# Patient Record
Sex: MALE | Race: WHITE | Employment: UNEMPLOYED | URBAN - METROPOLITAN AREA
[De-identification: names, ages, dates, MRNs, and addresses within clinical notes are randomized per-mention and may not be internally consistent; named-entity substitution may affect disease eponyms.]

---

## 2017-01-04 PROBLEM — M54.12 CERVICAL RADICULOPATHY: Status: ACTIVE | Noted: 2017-01-04

## 2017-01-18 PROBLEM — Z98.1 HX OF FUSION OF CERVICAL SPINE: Status: ACTIVE | Noted: 2017-01-18

## 2017-01-18 PROBLEM — M47.812 SPONDYLOSIS OF CERVICAL REGION WITHOUT MYELOPATHY OR RADICULOPATHY: Status: ACTIVE | Noted: 2017-01-18

## 2017-02-17 ENCOUNTER — MED REC SCAN ONLY (OUTPATIENT)
Dept: INTERNAL MEDICINE CLINIC | Facility: CLINIC | Age: 47
End: 2017-02-17

## 2017-08-11 ENCOUNTER — TELEPHONE (OUTPATIENT)
Dept: INTERNAL MEDICINE CLINIC | Facility: CLINIC | Age: 47
End: 2017-08-11

## 2017-08-11 NOTE — TELEPHONE ENCOUNTER
Pt called stating he was out of town and he has pain in his upper back-no injury-he said he was going to the hospital in [place] and would call us back on Monday to sched hosp fup w/triage when he gets back for sometime next week

## 2017-09-18 ENCOUNTER — TELEPHONE (OUTPATIENT)
Dept: INTERNAL MEDICINE CLINIC | Facility: CLINIC | Age: 47
End: 2017-09-18

## 2017-09-18 NOTE — TELEPHONE ENCOUNTER
Please call patient. We received a paper prescription order requesting Lidopril. I don't see that I have ever prescribed that for patient. Did he have another doctor prescribe this in the past. Why does he need this med?  I think he may be seeing Ortho or N

## 2017-09-25 NOTE — TELEPHONE ENCOUNTER
#3 LM for pt at [phone] (H) vm to call back re: Lidopril medication, need information re: the med.

## 2017-12-01 ENCOUNTER — OFFICE VISIT (OUTPATIENT)
Dept: FAMILY MEDICINE CLINIC | Facility: CLINIC | Age: 47
End: 2017-12-01

## 2017-12-01 VITALS
TEMPERATURE: 98 F | HEIGHT: 72 IN | HEART RATE: 76 BPM | DIASTOLIC BLOOD PRESSURE: 68 MMHG | BODY MASS INDEX: 28.71 KG/M2 | SYSTOLIC BLOOD PRESSURE: 106 MMHG | WEIGHT: 212 LBS

## 2017-12-01 DIAGNOSIS — Z00.00 ROUTINE GENERAL MEDICAL EXAMINATION AT A HEALTH CARE FACILITY: Primary | ICD-10-CM

## 2017-12-01 DIAGNOSIS — J01.10 ACUTE FRONTAL SINUSITIS, RECURRENCE NOT SPECIFIED: ICD-10-CM

## 2017-12-01 PROCEDURE — 99213 OFFICE O/P EST LOW 20 MIN: CPT | Performed by: FAMILY MEDICINE

## 2017-12-01 PROCEDURE — 99396 PREV VISIT EST AGE 40-64: CPT | Performed by: FAMILY MEDICINE

## 2017-12-01 RX ORDER — AZITHROMYCIN 250 MG/1
TABLET, FILM COATED ORAL
Qty: 6 TABLET | Refills: 0 | Status: SHIPPED | OUTPATIENT
Start: 2017-12-01 | End: 2018-10-23 | Stop reason: ALTCHOICE

## 2017-12-01 RX ORDER — LEVALBUTEROL TARTRATE 45 UG/1
1-2 AEROSOL, METERED ORAL
COMMUNITY
Start: 2017-09-28

## 2017-12-01 RX ORDER — PANTOPRAZOLE SODIUM 20 MG/1
1 TABLET, DELAYED RELEASE ORAL EVERY MORNING
COMMUNITY
Start: 2017-10-24 | End: 2019-12-03

## 2017-12-01 RX ORDER — BECLOMETHASONE DIPROPIONATE 80 UG/1
2 AEROSOL, METERED NASAL DAILY
COMMUNITY
Start: 2017-09-28

## 2017-12-01 NOTE — PROGRESS NOTES
Sheron Shi is a 52year old male who is here for Patient presents with:  Sinus Problem: Rm. 1      HPI:     URI  -started 2 wks ago  -associated with sinus congestion and pressure and nasal congestion  -previous treatment: dayquil hasn't helped as Encounters:  12/01/17 : 212 lb  01/18/17 : 210 lb  01/04/17 : 215 lb  10/05/16 : 209 lb  03/25/15 : 225 lb  01/23/15 : 219 lb        Pcn [Penicillins]       Unknown    Comment:Possible sensitivity, Family history    Patient Active Problem List:     Monet Oar TRANSFORAMINAL *      Comment: Procedure: TRANSFORAMINAL EPIDURAL - LUMBAR;                 Surgeon: Maria Elena Manrique MD;  Location: [address] MANAGEMENT  12/10/2014: INJECTION, W/WO CONTRAST, DX/THERAPEUTIC SUBST* N/A      Comment: previous total cholesterol lab    ASSESSMENT AND PLAN:     Health Maintenance  -We discussed the following:  Healthy diet and exercise, immunizations, and cancer screening    -Fasting labs ordered    Stress Management: counseled - f/u if worsening  Not int

## 2017-12-01 NOTE — PATIENT INSTRUCTIONS
--rest, fluids, soups, humidifier, tea with lemon or honey, tylenol/advil as needed for discomfort  -- cough syrup before bed or as needed (no driving as may cause drowsiness)  -- otc generic mucinex (or with DM if coughing)  can help loosen up congestio

## 2017-12-06 ENCOUNTER — LAB ENCOUNTER (OUTPATIENT)
Dept: LAB | Age: 47
End: 2017-12-06
Attending: FAMILY MEDICINE
Payer: COMMERCIAL

## 2017-12-06 DIAGNOSIS — Z00.00 ROUTINE GENERAL MEDICAL EXAMINATION AT A HEALTH CARE FACILITY: ICD-10-CM

## 2017-12-06 PROCEDURE — 36415 COLL VENOUS BLD VENIPUNCTURE: CPT | Performed by: FAMILY MEDICINE

## 2017-12-06 PROCEDURE — 80050 GENERAL HEALTH PANEL: CPT | Performed by: FAMILY MEDICINE

## 2017-12-06 PROCEDURE — 82306 VITAMIN D 25 HYDROXY: CPT | Performed by: FAMILY MEDICINE

## 2017-12-06 PROCEDURE — 80061 LIPID PANEL: CPT | Performed by: FAMILY MEDICINE

## 2018-08-13 ENCOUNTER — HOSPITAL ENCOUNTER (OUTPATIENT)
Age: 48
Discharge: HOME | End: 2018-08-13
Payer: COMMERCIAL

## 2018-08-13 VITALS
DIASTOLIC BLOOD PRESSURE: 85 MMHG | RESPIRATION RATE: 19 BRPM | SYSTOLIC BLOOD PRESSURE: 129 MMHG | HEART RATE: 83 BPM | OXYGEN SATURATION: 97 % | TEMPERATURE: 99 F

## 2018-08-13 DIAGNOSIS — S91.332A PUNCTURE WOUND OF PLANTAR ASPECT OF LEFT FOOT, INITIAL ENCOUNTER: Primary | ICD-10-CM

## 2018-08-13 PROCEDURE — 99213 OFFICE O/P EST LOW 20 MIN: CPT

## 2018-08-13 PROCEDURE — 90471 IMMUNIZATION ADMIN: CPT

## 2018-08-13 NOTE — ED PROVIDER NOTES
Patient Seen in: [facility]    History   Patient presents with:  Laceration Abrasion (integumentary)    Stated Complaint: left foot puncture wound stepped on nail    HPI  Elena Clarion is a 75-year-old male who comes in today after ac [facility]  4/5/2013: INJECTION, ANESTHETIC/STEROID, TRANSFORAMINAL *      Comment: Procedure: TRANSFORAMINAL EPIDURAL - LUMBAR;                 Surgeon: Davin Aldana MD;  Location: [facility] [address] Constitutional: He is oriented to person, place, and time. He appears well-developed and well-nourished. No distress. HENT:   Head: Normocephalic and atraumatic. Neck: Normal range of motion.    Cardiovascular: Normal rate, regular rhythm, normal heart Medications Prescribed:  Current Discharge Medication List         I have given the patient instructions regarding his diagnosis, expectations, follow up, and return to the ER precautions.   I explained to the patient that emergent conditions may arise to r

## 2018-10-23 ENCOUNTER — OFFICE VISIT (OUTPATIENT)
Dept: FAMILY MEDICINE CLINIC | Facility: CLINIC | Age: 48
End: 2018-10-23
Payer: COMMERCIAL

## 2018-10-23 VITALS
DIASTOLIC BLOOD PRESSURE: 78 MMHG | SYSTOLIC BLOOD PRESSURE: 126 MMHG | BODY MASS INDEX: 28.17 KG/M2 | OXYGEN SATURATION: 97 % | TEMPERATURE: 98 F | HEIGHT: 72 IN | HEART RATE: 90 BPM | WEIGHT: 208 LBS

## 2018-10-23 DIAGNOSIS — M51.36 DDD (DEGENERATIVE DISC DISEASE), LUMBAR: ICD-10-CM

## 2018-10-23 DIAGNOSIS — Z98.890 STATUS POST LUMBAR SURGERY: ICD-10-CM

## 2018-10-23 DIAGNOSIS — M54.16 LUMBAR RADICULOPATHY: Primary | ICD-10-CM

## 2018-10-23 PROCEDURE — 99215 OFFICE O/P EST HI 40 MIN: CPT | Performed by: FAMILY MEDICINE

## 2018-10-23 RX ORDER — CYCLOBENZAPRINE HCL 10 MG
10 TABLET ORAL 3 TIMES DAILY
Qty: 30 TABLET | Refills: 1 | Status: SHIPPED | OUTPATIENT
Start: 2018-10-23 | End: 2018-11-12

## 2018-10-23 RX ORDER — NABUMETONE 500 MG/1
500 TABLET, FILM COATED ORAL 2 TIMES DAILY PRN
Qty: 60 TABLET | Refills: 1 | Status: SHIPPED | OUTPATIENT
Start: 2018-10-23 | End: 2019-12-03

## 2018-10-23 NOTE — PATIENT INSTRUCTIONS
-- start nabumetone 2x/day as needed with food for pain  -- if upset stomach, can go back to ibuprofen/advil as needed   -- cyclobenzaprine as needed for more severe pain (may cause drowsiness)  -- call to schedule PT  -- followup 4 wks  For recheck  --

## 2018-10-26 NOTE — PROGRESS NOTES
Maggie Lord is a 50year old male here for Patient presents with:  Back Pain: x 8 years on and off getting worse for the last 3 weeks /room 3 MM      HPI:     Back pain  -started many years ago (about 8)  -4 yrs ago, had back surgery  -helped make pa ANESTHETIC/STEROID, TRANSFORAMINAL EPIDURAL; LUMBAR/SACRAL, SINGLE LEVEL  4/5/2013    Procedure: TRANSFORAMINAL EPIDURAL - LUMBAR;  Surgeon: Leesa Alvarez MD;  Location: [address] [place] [address]   • INJECTION, W/WO CONTRAST, DX/THERAPEUTIC SUBSTANC prior to today's visit):    Current Outpatient Medications:  Cyclobenzaprine HCl 10 MG Oral Tab Take 1 tablet (10 mg total) by mouth 3 (three) times daily for 20 days.  Disp: 30 tablet Rfl: 1   Nabumetone 500 MG Oral Tab Take 1 tablet (500 mg total) by mout deficits reassuring  -encouraged exercise, stretching (handouts given), warm/ice packs prn  -nabumetone prn (take with food) and short course of muscle relaxer (no driving while on as may cause drowsiness)  -start PT as planned  -if pain improving, will re

## 2018-10-29 ENCOUNTER — OFFICE VISIT (OUTPATIENT)
Dept: PHYSICAL THERAPY | Age: 48
End: 2018-10-29
Attending: FAMILY MEDICINE
Payer: COMMERCIAL

## 2018-10-29 DIAGNOSIS — M54.16 LUMBAR RADICULOPATHY: ICD-10-CM

## 2018-10-29 DIAGNOSIS — M51.36 DDD (DEGENERATIVE DISC DISEASE), LUMBAR: ICD-10-CM

## 2018-10-29 DIAGNOSIS — Z98.890 STATUS POST LUMBAR SURGERY: ICD-10-CM

## 2018-10-29 PROCEDURE — 97161 PT EVAL LOW COMPLEX 20 MIN: CPT | Performed by: PHYSICAL THERAPIST

## 2018-10-29 PROCEDURE — 97110 THERAPEUTIC EXERCISES: CPT | Performed by: PHYSICAL THERAPIST

## 2018-10-29 NOTE — PROGRESS NOTES
SPINE EVALUATION:   Referring Physician: Dr. Marce Garcia  Diagnosis: Lumbar radiculopathy DDD lumbar spine     Date of Service: 10/29/2018     PATIENT SUMMARY   Anuj Miller is a 50year old y/o male who presents to therapy today with complaints of chron you for referring Sadie Davila to [facility].     Precautions:  None  OBJECTIVE:   Observation/Posture: forward head, rounded shoulders, seated with post pelvic tilt  Gait: no deviations noted  Neuro Screen: dermatome, myotome and reflexes normal and improve spinal safety (8 visits)  · Pt will report improved symptom centralization and absence of radicular symptoms for 3 consecutive days to improve function with ADL (8 visits)  · Pt will have decreased paraspinal mm tension for improved lumbar mobi

## 2018-11-01 ENCOUNTER — OFFICE VISIT (OUTPATIENT)
Dept: PHYSICAL THERAPY | Age: 48
End: 2018-11-01
Attending: FAMILY MEDICINE
Payer: COMMERCIAL

## 2018-11-01 PROCEDURE — 97110 THERAPEUTIC EXERCISES: CPT | Performed by: PHYSICAL THERAPIST

## 2018-11-01 NOTE — PROGRESS NOTES
Dx: lumbar radiculopathy         Authorized # of Visits:  n/a         Next MD visit: none scheduled  Fall Risk: standard         Precautions: n/a             Subjective: Pt states he has been feeling better with exercises and medications since evaluation. Gr II, STM to lumbar paraspinals/ R piriformis           MINS                                          Charges: there ex X 3       Total Timed Treatment: 45 min  Total Treatment Time: 45 min

## 2018-11-06 ENCOUNTER — OFFICE VISIT (OUTPATIENT)
Dept: PHYSICAL THERAPY | Age: 48
End: 2018-11-06
Attending: FAMILY MEDICINE
Payer: COMMERCIAL

## 2018-11-06 PROCEDURE — 97140 MANUAL THERAPY 1/> REGIONS: CPT | Performed by: PHYSICAL THERAPIST

## 2018-11-06 PROCEDURE — 97110 THERAPEUTIC EXERCISES: CPT | Performed by: PHYSICAL THERAPIST

## 2018-11-06 NOTE — PROGRESS NOTES
Dx: lumbar radiculopathy         Authorized # of Visits:  n/a         Next MD visit: none scheduled  Fall Risk: standard         Precautions: n/a             Subjective: Pt reports improvement in intensity of pain by 2 points. No pain in leg when walking. Supine pelvic tilt X 20 Pelvic tilt X 20        Lower trunk rotation X 20 Lower trunk rotation X 20        Piriformis stretch 30 sec X 3 Side lying trunk rotation X 10 ea        TA bracing X 10 Hip adductor isometric X 20        Knee fall out with TA bra

## 2018-11-08 ENCOUNTER — OFFICE VISIT (OUTPATIENT)
Dept: PHYSICAL THERAPY | Age: 48
End: 2018-11-08
Attending: FAMILY MEDICINE
Payer: COMMERCIAL

## 2018-11-08 ENCOUNTER — HOSPITAL ENCOUNTER (OUTPATIENT)
Dept: MRI IMAGING | Age: 48
Discharge: HOME | End: 2018-11-08
Attending: FAMILY MEDICINE
Payer: COMMERCIAL

## 2018-11-08 DIAGNOSIS — M51.36 DDD (DEGENERATIVE DISC DISEASE), LUMBAR: ICD-10-CM

## 2018-11-08 DIAGNOSIS — M54.16 LUMBAR RADICULOPATHY: ICD-10-CM

## 2018-11-08 DIAGNOSIS — Z98.890 STATUS POST LUMBAR SURGERY: ICD-10-CM

## 2018-11-08 PROCEDURE — 72148 MRI LUMBAR SPINE W/O DYE: CPT | Performed by: FAMILY MEDICINE

## 2018-11-08 PROCEDURE — 97110 THERAPEUTIC EXERCISES: CPT | Performed by: PHYSICAL THERAPIST

## 2018-11-08 NOTE — PROGRESS NOTES
Dx: lumbar radiculopathy         Authorized # of Visits:  n/a         Next MD visit: none scheduled  Fall Risk: standard         Precautions: n/a             Subjective: Pt states his hip felt a lot better after 1 hr post PT until this morning.  Pt continue in back no change in leg Dynamic HS stretch in neutral, IR, ER 5 sec X 5       Supine pelvic tilt X 20 Pelvic tilt X 20 Hip swings F-B, S-S X 20       Lower trunk rotation X 20 Lower trunk rotation X 20 1/2 kneel hip flexor stretch 30 sec X 3       Pirifor

## 2018-11-13 ENCOUNTER — OFFICE VISIT (OUTPATIENT)
Dept: PHYSICAL THERAPY | Age: 48
End: 2018-11-13
Attending: FAMILY MEDICINE
Payer: COMMERCIAL

## 2018-11-13 PROCEDURE — 97110 THERAPEUTIC EXERCISES: CPT | Performed by: PHYSICAL THERAPIST

## 2018-11-13 NOTE — PROGRESS NOTES
Dx: lumbar radiculopathy         Authorized # of Visits:  n/a         Next MD visit: none scheduled  Fall Risk: standard         Precautions: n/a             Subjective: Pt reports feeling better for 2 days post PT.  Increased pain noted on Saturday from pr extension with and without exhalation X 20 Prone press up X 5 increase pain in back no change in leg Dynamic HS stretch in neutral, IR, ER 5 sec X 5 Dynamic HS stretch in neutral, IR, ER 5 sec X 5      Supine pelvic tilt X 20 Pelvic tilt X 20 Hip swings F-

## 2018-11-15 ENCOUNTER — APPOINTMENT (OUTPATIENT)
Dept: PHYSICAL THERAPY | Age: 48
End: 2018-11-15
Attending: FAMILY MEDICINE
Payer: COMMERCIAL

## 2018-11-20 ENCOUNTER — OFFICE VISIT (OUTPATIENT)
Dept: PHYSICAL THERAPY | Age: 48
End: 2018-11-20
Attending: FAMILY MEDICINE
Payer: COMMERCIAL

## 2018-11-20 PROCEDURE — 97110 THERAPEUTIC EXERCISES: CPT | Performed by: PHYSICAL THERAPIST

## 2018-11-20 NOTE — PROGRESS NOTES
Dx: lumbar radiculopathy         Authorized # of Visits:  n/a         Next MD visit: none scheduled  Fall Risk: standard         Precautions: n/a             Subjective: Pt reports decrease in frequency of flare ups of low back symptoms by 50%.  Pt reports stretch 30 sec X 3     Standing lumbar extension with and without exhalation X 20 Prone press up X 5 increase pain in back no change in leg Dynamic HS stretch in neutral, IR, ER 5 sec X 5 Dynamic HS stretch in neutral, IR, ER 5 sec X 5 Dynamic HS stretch i

## 2018-11-27 ENCOUNTER — OFFICE VISIT (OUTPATIENT)
Dept: PHYSICAL THERAPY | Age: 48
End: 2018-11-27
Attending: FAMILY MEDICINE
Payer: COMMERCIAL

## 2018-11-27 PROCEDURE — 97140 MANUAL THERAPY 1/> REGIONS: CPT | Performed by: PHYSICAL THERAPIST

## 2018-11-27 PROCEDURE — 97110 THERAPEUTIC EXERCISES: CPT | Performed by: PHYSICAL THERAPIST

## 2018-11-27 NOTE — PROGRESS NOTES
Dx: lumbar radiculopathy         Authorized # of Visits:  n/a         Next MD visit: none scheduled  Fall Risk: standard         Precautions: n/a             Subjective: Pt reports increase in pain due to return to full work out yesterday.  Pain in R LE at sec X 3 Slant board stretch 30 sec X 3 Slant board stretch 30 sec X 3 Slant board stretch 30 sec X 3 Slant board stretch 30 sec X 3    Standing lumbar extension with and without exhalation X 20 Prone press up X 5 increase pain in back no change in leg [place] Charges: there ex X 2, man there X 1    Total Timed Treatment: 45 min  Total Treatment Time: 45 min

## 2018-11-29 ENCOUNTER — APPOINTMENT (OUTPATIENT)
Dept: PHYSICAL THERAPY | Age: 48
End: 2018-11-29
Payer: COMMERCIAL

## 2018-12-04 ENCOUNTER — OFFICE VISIT (OUTPATIENT)
Dept: PHYSICAL THERAPY | Age: 48
End: 2018-12-04
Attending: FAMILY MEDICINE
Payer: COMMERCIAL

## 2018-12-04 PROCEDURE — 97110 THERAPEUTIC EXERCISES: CPT | Performed by: PHYSICAL THERAPIST

## 2018-12-04 NOTE — PROGRESS NOTES
Dx: lumbar radiculopathy         Authorized # of Visits:  n/a         Next MD visit: none scheduled  Fall Risk: standard         Precautions: n/a            Progress Summary    Pt has attended 8 visits in Physical Therapy.      Subjective: Mr. Ruth Gomez repor improved symptom centralization and absence of radicular symptoms for 3 consecutive days to improve function with ADL (8 visits) PROGRESSING  · Pt will have decreased paraspinal mm tension for improved lumbar mobility to tolerate standing >30 minutes for w EX 45  MINS       Recumbent bike 5 min level 4 Recumbent bike 5 min level 4 Upright bike X 5 min Recumbent bike X 5 min level 4       Slant board stretch 30 sec X 3 Slant board stretch 30 sec X 3 Slant board stretch 30 sec X 3 Slant board stretch 30 sec X

## 2018-12-06 ENCOUNTER — APPOINTMENT (OUTPATIENT)
Dept: PHYSICAL THERAPY | Age: 48
End: 2018-12-06
Payer: COMMERCIAL

## 2018-12-11 ENCOUNTER — APPOINTMENT (OUTPATIENT)
Dept: PHYSICAL THERAPY | Age: 48
End: 2018-12-11
Attending: FAMILY MEDICINE
Payer: COMMERCIAL

## 2018-12-13 ENCOUNTER — OFFICE VISIT (OUTPATIENT)
Dept: PHYSICAL THERAPY | Age: 48
End: 2018-12-13
Attending: FAMILY MEDICINE
Payer: COMMERCIAL

## 2018-12-13 PROCEDURE — 97110 THERAPEUTIC EXERCISES: CPT | Performed by: PHYSICAL THERAPIST

## 2018-12-13 NOTE — PROGRESS NOTES
Dx: lumbar radiculopathy         Authorized # of Visits:  n/a         Next MD visit: none scheduled  Fall Risk: standard         Precautions: n/a      Subjective: Pt reports decreasing frequency of leg pain with less intensity.  Pt is now able to do more ph Date: 11/20/2018  TX#: 6/8 Date: 11/27/2018  TX#: 7/8 Date: 12/4/2018  TX#: 8/8 Date: 12/13/2018  TX#: 9/16 Date:  TX#: /16 Date:  TX#: /16 Date:  TX#: /16   THERE EX 45  MINS THERE EX 45  MINS THERE EX 30  MINS THERE EX 45  MINS THERE EX 39  MINS      Rec Reassessment Hip hinge with cane X 20        Central PA glide lumbar spine, side lying lumbar gapping gr IV, V, unilat glides with sciatic nerve stretch  Hip hinge without cane X 10          Floor to waist lift 10# X 20                                Charg

## 2018-12-19 ENCOUNTER — OFFICE VISIT (OUTPATIENT)
Dept: PHYSICAL THERAPY | Age: 48
End: 2018-12-19
Attending: FAMILY MEDICINE
Payer: COMMERCIAL

## 2018-12-19 PROCEDURE — 97110 THERAPEUTIC EXERCISES: CPT | Performed by: PHYSICAL THERAPIST

## 2018-12-19 NOTE — PROGRESS NOTES
Dx: lumbar radiculopathy         Authorized # of Visits:  n/a         Next MD visit: none scheduled  Fall Risk: standard         Precautions: n/a      Subjective: Pt continues to report improving leg and back pain stating he now has intermittent low back p 11/20/2018  TX#: 6/8 Date: 11/27/2018  TX#: 7/8 Date: 12/4/2018  TX#: 8/8 Date: 12/13/2018  TX#: 9/16 Date: 12/19/2018  TX#: 10/16 Date:  TX#: /16 Date:  TX#: /16   THERE EX 45  MINS THERE EX 45  MINS THERE EX 30  MINS THERE EX [place] THERE EX 39  MINS T press 6 bands 2 X 20 Hip abd isometric X 20 Shuttle unilat leg press 4 bands 2 X 10 SB supine walkout X 10 Shuttle unilat leg press 4 bands 2 X 20     Seated piriformis 30 sec X 3 Seated sciatic N glide sliders 2 X 10 Standing lumbar extension X 20 Lateral

## 2018-12-26 ENCOUNTER — APPOINTMENT (OUTPATIENT)
Dept: PHYSICAL THERAPY | Age: 48
End: 2018-12-26
Attending: FAMILY MEDICINE
Payer: COMMERCIAL

## 2019-01-08 ENCOUNTER — OFFICE VISIT (OUTPATIENT)
Dept: PHYSICAL THERAPY | Age: 49
End: 2019-01-08
Attending: FAMILY MEDICINE
Payer: COMMERCIAL

## 2019-01-08 PROCEDURE — 97110 THERAPEUTIC EXERCISES: CPT | Performed by: PHYSICAL THERAPIST

## 2019-01-08 NOTE — PROGRESS NOTES
Dx: lumbar radiculopathy         Authorized # of Visits:  n/a         Next MD visit: none scheduled  Fall Risk: standard         Precautions: n/a      Subjective: Pt reports continued pain relief stating R leg pain is now intermittent ranging between 0-2/1 5/8 Date: 11/20/2018  TX#: 6/8 Date: 11/27/2018  TX#: 7/8 Date: 12/4/2018  TX#: 8/8 Date: 12/13/2018  TX#: 9/16 Date: 12/19/2018  TX#: 10/16 Date: 1/8/2019  TX#:11 /16 Date:  TX#: /16   THERE EX 45  MINS THERE EX 45  MINS THERE EX 30  MINS THERE EX 45  MIN Shuttle unilat leg press 4 bands X 20 Shuttle unilat leg press 4 bands X 20 Supine march with TA brace X 20 Shuttle chayo leg press 6 bands 2 X 20 Seated SB knee ext 2 X 10 Shuttle chayo leg press 8 bands 2 X 20 SB seated pelvic tilt F-B, S-S, circles CW, CC

## 2019-01-15 ENCOUNTER — OFFICE VISIT (OUTPATIENT)
Dept: PHYSICAL THERAPY | Age: 49
End: 2019-01-15
Attending: FAMILY MEDICINE
Payer: COMMERCIAL

## 2019-01-15 PROCEDURE — 97110 THERAPEUTIC EXERCISES: CPT | Performed by: PHYSICAL THERAPIST

## 2019-01-15 NOTE — PROGRESS NOTES
Dx: lumbar radiculopathy         Authorized # of Visits:  n/a         Next MD visit: none scheduled  Fall Risk: standard         Precautions: n/a      Subjective: Pt reports manageable R leg pain stating he is now more active without back pain.     Objectiv THERE EX 45  MINS THERE EX 45  MINS THERE EX 45  MINS   Upright bike X 5 min Upright bike X 5 min Elliptical X 5 min Elliptical X 5 min   Slant board stretch 30 sec X 3 Slant board stretch 30 sec X 3 Slant board stretch 30 sec X 3 Slant board stretch 30 se

## 2019-01-22 ENCOUNTER — OFFICE VISIT (OUTPATIENT)
Dept: PHYSICAL THERAPY | Age: 49
End: 2019-01-22
Attending: FAMILY MEDICINE
Payer: COMMERCIAL

## 2019-01-22 PROCEDURE — 97110 THERAPEUTIC EXERCISES: CPT | Performed by: PHYSICAL THERAPIST

## 2019-01-22 PROCEDURE — 97140 MANUAL THERAPY 1/> REGIONS: CPT | Performed by: PHYSICAL THERAPIST

## 2019-01-22 NOTE — PROGRESS NOTES
Dx: lumbar radiculopathy         Authorized # of Visits:  n/a         Next MD visit: none scheduled  Fall Risk: standard         Precautions: n/a       Discharge Summary    Pt has attended 13 visits in Physical Therapy.      Subjective: Mr. Kay Bautista reports have decreased paraspinal mm tension for improved lumbar mobility to tolerate standing >30 minutes for work and home activities (8 visits) MET (1/22/2019)  · Pt will be independent and compliant with comprehensive HEP to maintain progress achieved in PT (8 B, 23# R, L X 10 ea [employer] column resisted walking 27# F, B, R, L X 10 ea Planks 30 sec X 3 SB pelvic tilts F-B, S-S, circles CW, CCW X 20 ea      Dynamic HS stretch in neutral, IR, ER 5 sec X 5  [employer] column wood chops 10# 2 X 10 Sh

## 2019-12-03 ENCOUNTER — OFFICE VISIT (OUTPATIENT)
Dept: FAMILY MEDICINE CLINIC | Facility: CLINIC | Age: 49
End: 2019-12-03
Payer: COMMERCIAL

## 2019-12-03 ENCOUNTER — LAB ENCOUNTER (OUTPATIENT)
Dept: LAB | Age: 49
End: 2019-12-03
Attending: FAMILY MEDICINE
Payer: COMMERCIAL

## 2019-12-03 VITALS
OXYGEN SATURATION: 98 % | DIASTOLIC BLOOD PRESSURE: 72 MMHG | TEMPERATURE: 98 F | SYSTOLIC BLOOD PRESSURE: 124 MMHG | HEART RATE: 60 BPM | BODY MASS INDEX: 29.44 KG/M2 | HEIGHT: 71.65 IN | WEIGHT: 215 LBS

## 2019-12-03 DIAGNOSIS — Z00.00 ROUTINE GENERAL MEDICAL EXAMINATION AT A HEALTH CARE FACILITY: Primary | ICD-10-CM

## 2019-12-03 DIAGNOSIS — M54.16 LUMBAR RADICULOPATHY: ICD-10-CM

## 2019-12-03 DIAGNOSIS — J01.10 ACUTE FRONTAL SINUSITIS, RECURRENCE NOT SPECIFIED: ICD-10-CM

## 2019-12-03 DIAGNOSIS — Z00.00 ROUTINE GENERAL MEDICAL EXAMINATION AT A HEALTH CARE FACILITY: ICD-10-CM

## 2019-12-03 PROCEDURE — 80050 GENERAL HEALTH PANEL: CPT | Performed by: FAMILY MEDICINE

## 2019-12-03 PROCEDURE — 36415 COLL VENOUS BLD VENIPUNCTURE: CPT | Performed by: FAMILY MEDICINE

## 2019-12-03 PROCEDURE — 99396 PREV VISIT EST AGE 40-64: CPT | Performed by: FAMILY MEDICINE

## 2019-12-03 PROCEDURE — 80061 LIPID PANEL: CPT | Performed by: FAMILY MEDICINE

## 2019-12-03 PROCEDURE — 99214 OFFICE O/P EST MOD 30 MIN: CPT | Performed by: FAMILY MEDICINE

## 2019-12-03 RX ORDER — AZITHROMYCIN 250 MG/1
TABLET, FILM COATED ORAL
Qty: 6 TABLET | Refills: 0 | Status: SHIPPED | OUTPATIENT
Start: 2019-12-03 | End: 2020-03-06 | Stop reason: ALTCHOICE

## 2019-12-03 RX ORDER — NABUMETONE 500 MG/1
500 TABLET, FILM COATED ORAL 2 TIMES DAILY PRN
Qty: 60 TABLET | Refills: 1 | Status: SHIPPED | OUTPATIENT
Start: 2019-12-03 | End: 2020-04-13

## 2019-12-03 NOTE — PROGRESS NOTES
Maggie Lord is a 52year old male who is here for Patient presents with:  Sinus Problem: Headache, forehead pressure, and dizziness x 3 weeks.  No fever, no bodychills      HPI:     URI  -started 2-3 wks ago  -associated with sinus congestion and pr diarrhea, n/v, BRBPR, melena  : no dysuria, frequency, or hematuria  SKIN: denies any unusual skin lesions or rashes  PSYCH: mood is stable  EXT: denies edema     Wt Readings from Last 6 Encounters:  12/03/19 : 215 lb (97.5 kg)  10/23/18 : 208 lb (94.3 k Smoking status: Former Smoker        Packs/day: 1.00        Quit date: 2014        Years since quittin.5      Smokeless tobacco: Never Used    Alcohol use: Yes      Frequency: Monthly or less      Drinks per session: 1 or 2      Binge frequency:  Linda Easton This Visit:  Requested Prescriptions     Signed Prescriptions Disp Refills   • Nabumetone 500 MG Oral Tab 60 tablet 1     Sig: Take 1 tablet (500 mg total) by mouth 2 (two) times daily as needed for Pain.    • azithromycin 250 MG Oral Tab 6 tablet 0     Sig

## 2019-12-03 NOTE — PATIENT INSTRUCTIONS
Continue to eat well and exercise    Nabumetone as needed for pain    Restart qnasl daily  Start zpack if needed    Followup if stress worsening    Otherwise, followup in 6- 12 months, sooner if needed

## 2019-12-17 ENCOUNTER — TELEPHONE (OUTPATIENT)
Dept: FAMILY MEDICINE CLINIC | Facility: CLINIC | Age: 49
End: 2019-12-17

## 2019-12-17 DIAGNOSIS — R74.01 ELEVATED ALT MEASUREMENT: ICD-10-CM

## 2019-12-17 DIAGNOSIS — E78.00 HYPERCHOLESTEROLEMIA: Primary | ICD-10-CM

## 2019-12-17 NOTE — TELEPHONE ENCOUNTER
----- Message from Colette Marte MD sent at 12/16/2019  8:35 AM CST -----  cholesterol mildly elevated, increase exercise, reduce fats and sugars, more fruits and veggies    Liver test is also mildly elevated - likely due to fats and diet    Losing weigh

## 2019-12-23 ENCOUNTER — HOSPITAL ENCOUNTER (OUTPATIENT)
Age: 49
Discharge: HOME | End: 2019-12-23
Attending: EMERGENCY MEDICINE
Payer: COMMERCIAL

## 2019-12-23 VITALS
TEMPERATURE: 99 F | HEIGHT: 71 IN | WEIGHT: 211 LBS | DIASTOLIC BLOOD PRESSURE: 87 MMHG | SYSTOLIC BLOOD PRESSURE: 143 MMHG | HEART RATE: 86 BPM | RESPIRATION RATE: 18 BRPM | BODY MASS INDEX: 29.54 KG/M2 | OXYGEN SATURATION: 98 %

## 2019-12-23 DIAGNOSIS — H66.001 ACUTE SUPPURATIVE OTITIS MEDIA OF RIGHT EAR WITHOUT SPONTANEOUS RUPTURE OF TYMPANIC MEMBRANE, RECURRENCE NOT SPECIFIED: Primary | ICD-10-CM

## 2019-12-23 DIAGNOSIS — J06.9 VIRAL URI WITH COUGH: ICD-10-CM

## 2019-12-23 PROCEDURE — 99213 OFFICE O/P EST LOW 20 MIN: CPT

## 2019-12-23 PROCEDURE — 99214 OFFICE O/P EST MOD 30 MIN: CPT

## 2019-12-23 RX ORDER — METHYLPREDNISOLONE 4 MG/1
TABLET ORAL
Qty: 1 PACKAGE | Refills: 0 | Status: SHIPPED | OUTPATIENT
Start: 2019-12-23 | End: 2020-03-06

## 2019-12-23 RX ORDER — AMOXICILLIN AND CLAVULANATE POTASSIUM 875; 125 MG/1; MG/1
1 TABLET, FILM COATED ORAL 2 TIMES DAILY
Qty: 20 TABLET | Refills: 0 | Status: SHIPPED | OUTPATIENT
Start: 2019-12-23 | End: 2020-01-02

## 2019-12-23 NOTE — ED INITIAL ASSESSMENT (HPI)
Sinus pressure and congestion with post nasal drip  Headache  Denies any fever  Fatigue    Patient saw PCP 12/3/2019 and Rx'd Zithromax

## 2019-12-23 NOTE — ED PROVIDER NOTES
Patient Seen in: [facility]      History   Patient presents with:  Sinus Problem    Stated Complaint: sinus congestion    HPI    Patient is a pleasant 61-year-old male, non-smoker, presenting for evaluation of cough, nasal conge Smoking status: Former Smoker        Packs/day: 1.00        Quit date: 2014        Years since quittin.6      Smokeless tobacco: Never Used    Alcohol use: Yes      Frequency: Monthly or less      Drinks per session: 1 or 2      Binge frequency:  Nico Pipe instructions reviewed. Patient will be treated with a prescription for amoxicillin and Medrol Dosepak. Close outpatient follow-up was encouraged. Patient should rest and increase his oral fluid intake.   Ibuprofen should be taken as needed, as directed

## 2020-03-06 ENCOUNTER — OFFICE VISIT (OUTPATIENT)
Dept: FAMILY MEDICINE CLINIC | Facility: CLINIC | Age: 50
End: 2020-03-06
Payer: COMMERCIAL

## 2020-03-06 VITALS
BODY MASS INDEX: 27.8 KG/M2 | HEART RATE: 78 BPM | TEMPERATURE: 99 F | DIASTOLIC BLOOD PRESSURE: 50 MMHG | WEIGHT: 203 LBS | OXYGEN SATURATION: 98 % | SYSTOLIC BLOOD PRESSURE: 102 MMHG | HEIGHT: 71.46 IN

## 2020-03-06 DIAGNOSIS — H65.05 RECURRENT ACUTE SEROUS OTITIS MEDIA OF LEFT EAR: Primary | ICD-10-CM

## 2020-03-06 PROCEDURE — 99214 OFFICE O/P EST MOD 30 MIN: CPT | Performed by: FAMILY MEDICINE

## 2020-03-06 RX ORDER — CEFDINIR 300 MG/1
300 CAPSULE ORAL 2 TIMES DAILY
Qty: 14 CAPSULE | Refills: 0 | Status: SHIPPED | OUTPATIENT
Start: 2020-03-06

## 2020-03-06 NOTE — PATIENT INSTRUCTIONS
Continue qnasl daily - every day  Mucinex daily  Humidifier nightly    Start antibiotic if needed    If another ear infection despite antibiotic and daily qnasl, let me know - will refer to ENT

## 2020-03-06 NOTE — PROGRESS NOTES
CC:  Mirian Mcknight is a 52year old male here for Patient presents with:  Earache: Bilateral earache x 7 days      HPI:     URI  -started 7 days ago  -associated with bilateral ear ache - worse on left  -previous treatment: none  -no fevers  -sick cont ACDF   • BACK SURGERY  3/11/15    L3-L4, L4-L5 laser surgery, bone spurs in spine grinded down   • EXCIS LUMBAR DISK,ONE LEVEL     • LUMBAR EPIDURAL N/A 1/9/2015    Performed by Myorn Drew MD at [address] [place] [address] 1 above - no need to start abx)  - encouraged supportive care (rest, fluids, honey, humidifier, tylenol/ibuprofen prn)  - followup if not improving or worsening  -see ENT if recurs again    Orders This Visit:  No orders of the defined types were placed in th

## 2020-03-19 ENCOUNTER — TELEPHONE (OUTPATIENT)
Dept: FAMILY MEDICINE CLINIC | Facility: CLINIC | Age: 50
End: 2020-03-19

## 2020-03-19 RX ORDER — AMOXICILLIN AND CLAVULANATE POTASSIUM 875; 125 MG/1; MG/1
1 TABLET, FILM COATED ORAL 2 TIMES DAILY
Qty: 14 TABLET | Refills: 0 | Status: SHIPPED | OUTPATIENT
Start: 2020-03-19 | End: 2020-03-26

## 2020-03-19 NOTE — TELEPHONE ENCOUNTER
Pt called and said he was here 2 weeks ago for an ear infection and he said the antibiotic he was given did not work. He wants to know if he can get amoxicillin.   He uses [facility] on [id number] and [address] [facility].

## 2020-03-19 NOTE — TELEPHONE ENCOUNTER
Patient c/o pressure in the ears and sore throat x3 days. Requesting a different antibiotic. Denies cough, fever, SOB or other respiratory concerns.    No travel recently  No exposure to anyone with confirmed COVID19 virus  If needed, patient consented to

## 2020-03-19 NOTE — TELEPHONE ENCOUNTER
Called patient back  Sent in augmentin  Take probiotics 2x/day for next month  Call if still not better

## 2020-04-08 ENCOUNTER — TELEPHONE (OUTPATIENT)
Dept: FAMILY MEDICINE CLINIC | Facility: CLINIC | Age: 50
End: 2020-04-08

## 2020-04-08 DIAGNOSIS — J45.30 MILD PERSISTENT ASTHMA WITHOUT COMPLICATION: ICD-10-CM

## 2020-04-08 DIAGNOSIS — J02.9 PHARYNGITIS, UNSPECIFIED ETIOLOGY: Primary | ICD-10-CM

## 2020-04-08 DIAGNOSIS — J30.2 SEASONAL ALLERGIES: ICD-10-CM

## 2020-04-08 PROCEDURE — 99214 OFFICE O/P EST MOD 30 MIN: CPT | Performed by: FAMILY MEDICINE

## 2020-04-08 RX ORDER — AMOXICILLIN AND CLAVULANATE POTASSIUM 875; 125 MG/1; MG/1
1 TABLET, FILM COATED ORAL 2 TIMES DAILY
Qty: 14 TABLET | Refills: 0 | Status: SHIPPED | OUTPATIENT
Start: 2020-04-08 | End: 2020-04-15

## 2020-04-08 NOTE — TELEPHONE ENCOUNTER
Virtual/Telephone Check-In    Gina Ibrahim verbally consents to a Virtual/Telephone Check-In service on 4/8/20. Patient understands and accepts financial responsibility for any deductible, co-insurance and/or co-pays associated with this service. in extremities    • Unspecified essential hypertension       Past Surgical History:   Procedure Laterality Date   • BACK SURGERY  11/14/11    C5-C6 ACDF   • BACK SURGERY  3/11/15    L3-L4, L4-L5 laser surgery, bone spurs in spine grinded down   • EXCIS LUM Tartrate 45 MCG/ACT Inhalation Aerosol Inhale 1-2 puffs into the lungs every 4 to 6 hours as needed. • DULERA 100-5 MCG/ACT Inhalation Aerosol INHALE TWO PUFFS BID  4   • Desonide 0.05 % Apply Externally Cream as needed.   0   • Cetirizine HCl (ZYRTEC)

## 2020-04-08 NOTE — TELEPHONE ENCOUNTER
Patient reports headaches and sore throat x2 weeks. Denies cough, fever, SOB or other respiratory concerns, loss of smell or taste,  N/V/D.    No travel recently  No exposure to anyone with confirmed COVID19 virus  Patient has consented to an E-visit if

## 2020-04-13 RX ORDER — NABUMETONE 500 MG/1
500 TABLET, FILM COATED ORAL 2 TIMES DAILY PRN
Qty: 60 TABLET | Refills: 1 | Status: SHIPPED | OUTPATIENT
Start: 2020-04-13 | End: 2021-02-04

## 2020-04-13 NOTE — TELEPHONE ENCOUNTER
Patient transferring from [facility] to [facility], needs refill for Nabumetone. Pt requesting refill of Nabumetone 500 mg     Last Office Visit with Provider: 03/06/2020    No future appointments.

## 2020-11-02 NOTE — TELEPHONE ENCOUNTER
Patient states he's had a sore throat for a couple weeks now and can not seem to shake it. States he has no fever, no cough. No recent travel, no exposure to positive COVID-19. Patient ok with E-Visit if needed. Insurance verified. Epidermal Sutures: 5-0 Fast Absorbing Gut

## 2021-01-22 ENCOUNTER — TELEPHONE (OUTPATIENT)
Dept: FAMILY MEDICINE CLINIC | Facility: CLINIC | Age: 51
End: 2021-01-22

## 2021-01-22 DIAGNOSIS — Z20.822 SUSPECTED COVID-19 VIRUS INFECTION: Primary | ICD-10-CM

## 2021-01-22 DIAGNOSIS — Z20.822 CLOSE EXPOSURE TO COVID-19 VIRUS: ICD-10-CM

## 2021-01-23 ENCOUNTER — LAB ENCOUNTER (OUTPATIENT)
Dept: LAB | Age: 51
End: 2021-01-23
Attending: FAMILY MEDICINE
Payer: COMMERCIAL

## 2021-01-23 DIAGNOSIS — Z20.822 CLOSE EXPOSURE TO COVID-19 VIRUS: ICD-10-CM

## 2021-01-23 DIAGNOSIS — Z20.822 SUSPECTED COVID-19 VIRUS INFECTION: ICD-10-CM

## 2021-01-23 NOTE — TELEPHONE ENCOUNTER
While speaking on the phone with this patient's spouse, patient was present and has been having symptoms and would like to be tested for SARS-CoV-2/COVID-19. I did speak with patient directly regarding his symptoms.   Overall patient states his symptoms ar

## 2021-01-23 NOTE — PATIENT INSTRUCTIONS
Coronavirus Disease 2019 (COVID-19)     [facility] is committed to the safety and well-being of our patients, members, employees, and communities.  As concerns arise about the new strain of coronavirus that causes COVID-19, [facility] ? After 10 days without testing from date of last exposure  ? After day 7 from date of last exposure with a negative test result (test must occur on day 5 or later)  After stopping quarantine, you should  ? Watch for symptoms until 14 days after exposure. 10. Clean all surfaces that are touched often, like counters, tabletops, and doorknobs. Use household cleaning sprays or wipes according to the label instructions.          Seek Further Care     If you are awaiting test results or are confirmed positive for Patients with pending COVID-19 test results should follow all care and home isolation instructions. Your test results will be called to you from an [facility] representative.  If you have not received a call within 2 business days, please call your pr You can also get more information at the following websites:   Centers for Disease Control & Prevention (CDC)  What to do if you are sick with coronavirus disease 2019, [email or web address]. pdf

## 2021-01-25 ENCOUNTER — TELEPHONE (OUTPATIENT)
Dept: FAMILY MEDICINE CLINIC | Facility: CLINIC | Age: 51
End: 2021-01-25

## 2021-01-25 LAB — SARS-COV-2 RNA RESP QL NAA+PROBE: NOT DETECTED

## 2021-02-02 ENCOUNTER — APPOINTMENT (OUTPATIENT)
Dept: CV DIAGNOSTICS | Facility: HOSPITAL | Age: 51
End: 2021-02-02
Attending: EMERGENCY MEDICINE
Payer: COMMERCIAL

## 2021-02-02 ENCOUNTER — APPOINTMENT (OUTPATIENT)
Dept: GENERAL RADIOLOGY | Facility: HOSPITAL | Age: 51
End: 2021-02-02
Payer: COMMERCIAL

## 2021-02-02 ENCOUNTER — HOSPITAL ENCOUNTER (EMERGENCY)
Facility: HOSPITAL | Age: 51
Discharge: HOME | End: 2021-02-02
Attending: EMERGENCY MEDICINE
Payer: COMMERCIAL

## 2021-02-02 VITALS
HEART RATE: 94 BPM | WEIGHT: 205 LBS | TEMPERATURE: 98 F | RESPIRATION RATE: 16 BRPM | DIASTOLIC BLOOD PRESSURE: 91 MMHG | SYSTOLIC BLOOD PRESSURE: 137 MMHG | OXYGEN SATURATION: 98 % | BODY MASS INDEX: 28 KG/M2

## 2021-02-02 DIAGNOSIS — F41.9 ANXIETY: ICD-10-CM

## 2021-02-02 DIAGNOSIS — R07.89 CHEST PAIN, NON-CARDIAC: Primary | ICD-10-CM

## 2021-02-02 LAB
ALBUMIN SERPL-MCNC: 4 G/DL (ref 3.4–5)
ALBUMIN/GLOB SERPL: 1.1 {RATIO} (ref 1–2)
ALP LIVER SERPL-CCNC: 80 U/L
ALT SERPL-CCNC: 64 U/L
ANION GAP SERPL CALC-SCNC: 3 MMOL/L (ref 0–18)
AST SERPL-CCNC: 31 U/L (ref 15–37)
ATRIAL RATE: 82 BPM
BASOPHILS # BLD AUTO: 0.08 X10(3) UL (ref 0–0.2)
BASOPHILS NFR BLD AUTO: 1 %
BILIRUB SERPL-MCNC: 0.4 MG/DL (ref 0.1–2)
BUN BLD-MCNC: 17 MG/DL (ref 7–18)
BUN/CREAT SERPL: 15.7 (ref 10–20)
CALCIUM BLD-MCNC: 9 MG/DL (ref 8.5–10.1)
CHLORIDE SERPL-SCNC: 104 MMOL/L (ref 98–112)
CO2 SERPL-SCNC: 29 MMOL/L (ref 21–32)
CREAT BLD-MCNC: 1.08 MG/DL
D-DIMER: <0.27 UG/ML FEU (ref ?–0.5)
DEPRECATED RDW RBC AUTO: 42.1 FL (ref 35.1–46.3)
EOSINOPHIL # BLD AUTO: 0.28 X10(3) UL (ref 0–0.7)
EOSINOPHIL NFR BLD AUTO: 3.4 %
ERYTHROCYTE [DISTWIDTH] IN BLOOD BY AUTOMATED COUNT: 13.1 % (ref 11–15)
GLOBULIN PLAS-MCNC: 3.6 G/DL (ref 2.8–4.4)
GLUCOSE BLD-MCNC: 82 MG/DL (ref 70–99)
HCT VFR BLD AUTO: 47.8 %
HGB BLD-MCNC: 16.1 G/DL
IMM GRANULOCYTES # BLD AUTO: 0.05 X10(3) UL (ref 0–1)
IMM GRANULOCYTES NFR BLD: 0.6 %
LYMPHOCYTES # BLD AUTO: 2.45 X10(3) UL (ref 1–4)
LYMPHOCYTES NFR BLD AUTO: 29.8 %
M PROTEIN MFR SERPL ELPH: 7.6 G/DL (ref 6.4–8.2)
MCH RBC QN AUTO: 29.7 PG (ref 26–34)
MCHC RBC AUTO-ENTMCNC: 33.7 G/DL (ref 31–37)
MCV RBC AUTO: 88.2 FL
MONOCYTES # BLD AUTO: 0.8 X10(3) UL (ref 0.1–1)
MONOCYTES NFR BLD AUTO: 9.7 %
NEUTROPHILS # BLD AUTO: 4.57 X10 (3) UL (ref 1.5–7.7)
NEUTROPHILS # BLD AUTO: 4.57 X10(3) UL (ref 1.5–7.7)
NEUTROPHILS NFR BLD AUTO: 55.5 %
NT-PROBNP SERPL-MCNC: 40 PG/ML (ref ?–125)
OSMOLALITY SERPL CALC.SUM OF ELEC: 283 MOSM/KG (ref 275–295)
P AXIS: 73 DEGREES
P-R INTERVAL: 152 MS
PLATELET # BLD AUTO: 223 10(3)UL (ref 150–450)
POTASSIUM SERPL-SCNC: 3.5 MMOL/L (ref 3.5–5.1)
Q-T INTERVAL: 380 MS
QRS DURATION: 98 MS
QTC CALCULATION (BEZET): 443 MS
R AXIS: -27 DEGREES
RBC # BLD AUTO: 5.42 X10(6)UL
SARS-COV-2 RNA RESP QL NAA+PROBE: NOT DETECTED
SODIUM SERPL-SCNC: 136 MMOL/L (ref 136–145)
T AXIS: 50 DEGREES
TROPONIN I SERPL-MCNC: <0.045 NG/ML (ref ?–0.04)
VENTRICULAR RATE: 82 BPM
WBC # BLD AUTO: 8.2 X10(3) UL (ref 4–11)

## 2021-02-02 PROCEDURE — 83880 ASSAY OF NATRIURETIC PEPTIDE: CPT | Performed by: EMERGENCY MEDICINE

## 2021-02-02 PROCEDURE — 84484 ASSAY OF TROPONIN QUANT: CPT | Performed by: EMERGENCY MEDICINE

## 2021-02-02 PROCEDURE — 99285 EMERGENCY DEPT VISIT HI MDM: CPT | Performed by: EMERGENCY MEDICINE

## 2021-02-02 PROCEDURE — 93350 STRESS TTE ONLY: CPT | Performed by: EMERGENCY MEDICINE

## 2021-02-02 PROCEDURE — 93018 CV STRESS TEST I&R ONLY: CPT | Performed by: EMERGENCY MEDICINE

## 2021-02-02 PROCEDURE — 96374 THER/PROPH/DIAG INJ IV PUSH: CPT | Performed by: EMERGENCY MEDICINE

## 2021-02-02 PROCEDURE — 85025 COMPLETE CBC W/AUTO DIFF WBC: CPT | Performed by: EMERGENCY MEDICINE

## 2021-02-02 PROCEDURE — 85379 FIBRIN DEGRADATION QUANT: CPT | Performed by: EMERGENCY MEDICINE

## 2021-02-02 PROCEDURE — 85025 COMPLETE CBC W/AUTO DIFF WBC: CPT

## 2021-02-02 PROCEDURE — 80053 COMPREHEN METABOLIC PANEL: CPT | Performed by: EMERGENCY MEDICINE

## 2021-02-02 PROCEDURE — 93017 CV STRESS TEST TRACING ONLY: CPT | Performed by: EMERGENCY MEDICINE

## 2021-02-02 PROCEDURE — 71045 X-RAY EXAM CHEST 1 VIEW: CPT

## 2021-02-02 PROCEDURE — 93010 ELECTROCARDIOGRAM REPORT: CPT | Performed by: EMERGENCY MEDICINE

## 2021-02-02 PROCEDURE — 93005 ELECTROCARDIOGRAM TRACING: CPT

## 2021-02-02 RX ORDER — LORAZEPAM 2 MG/ML
1 INJECTION INTRAMUSCULAR ONCE
Status: COMPLETED | OUTPATIENT
Start: 2021-02-02 | End: 2021-02-02

## 2021-02-02 RX ORDER — ASPIRIN 81 MG/1
324 TABLET, CHEWABLE ORAL ONCE
Status: COMPLETED | OUTPATIENT
Start: 2021-02-02 | End: 2021-02-02

## 2021-02-02 RX ORDER — ALPRAZOLAM 0.25 MG/1
0.25 TABLET ORAL 3 TIMES DAILY PRN
Qty: 20 TABLET | Refills: 0 | Status: SHIPPED | OUTPATIENT
Start: 2021-02-02 | End: 2021-02-09

## 2021-02-02 RX ORDER — DIPHENHYDRAMINE HCL 50 MG
50 CAPSULE ORAL EVERY 6 HOURS PRN
COMMUNITY
End: 2021-02-04

## 2021-02-02 RX ORDER — MAGNESIUM HYDROXIDE/ALUMINUM HYDROXICE/SIMETHICONE 120; 1200; 1200 MG/30ML; MG/30ML; MG/30ML
30 SUSPENSION ORAL ONCE
Status: COMPLETED | OUTPATIENT
Start: 2021-02-02 | End: 2021-02-02

## 2021-02-02 NOTE — IMAGING NOTE
Preliminary stress echo  Patient resting ekg NSR. Patient completed 8 minutes on a maria protocol achieving 90%apmhr with rare PAC's with exercise. Stopped due to fatigue. Echo images to follow.

## 2021-02-02 NOTE — ED PROVIDER NOTES
Patient Seen in: [facility] Emergency Department      History   Patient presents with:  Chest Pain Angina    Stated Complaint:     HPI/Subjective:   HPI    63-year-old male complaining of chest discomfort the patient is chest discomfort currently it [facility]   • OTHER SURGICAL HISTORY  2011    Neck surgery   • TRANSFORAMINAL EPIDURAL - LUMBAR N/A 4/5/2013    Performed by Adelfo Lin MD at [address] [facility]   • TRANSFORAMINAL EPIDURAL - LUMBAR N/A 3/15/2013    Perform orders were created for panel order CBC WITH DIFFERENTIAL WITH PLATELET.   Procedure                               Abnormality         Status                     ---------                               -----------         ------                     CBC W/ D 19855  [phone]    In 2 days            Medications Prescribed:  Current Discharge Medication List    START taking these medications    ALPRAZolam 0.25 MG Oral Tab  Take 1 tablet (0.25 mg total) by mouth 3 (three) times daily as needed for Anxiety.   Qt

## 2021-02-02 NOTE — ED NOTES
PT states feels a little better after maalox,  More like he did at home, but doesn't know if it was the med or if he's just a little more relaxed.

## 2021-02-02 NOTE — ED INITIAL ASSESSMENT (HPI)
C/o SS chest pressure since last night, constant,  A little sob and nausea,  Pain is constant,  No change with movement or inspiration.

## 2021-02-02 NOTE — ED NOTES
Pt In room from stress echo, offers no complaints at this time. Will continue to monitor and await MD orders.

## 2021-02-05 NOTE — PROGRESS NOTES
Virtual/Telephone Check-In    Sheri Mejia is a 48year old male here today for a telemedicine audio and video visit.       HPI:     Anxiety  -worsening lately - multiple stressors, son with severe autism is having housing issues, work stress  -used to Past Medical History:   Diagnosis Date   • Allergic rhinitis    • Asthma    • Extrinsic asthma, unspecified    • Fatigue    • Headache    • Light headedness    • Skin disorder    • Tingling in extremities    • Unspecified essential hypertension daily as needed for Anxiety (or sleep). 30 tablet 1   • Nabumetone 500 MG Oral Tab Take 1 tablet (500 mg total) by mouth 2 (two) times daily as needed for Pain.  60 tablet 1   • ALPRAZolam 0.25 MG Oral Tab Take 1 tablet (0.25 mg total) by mouth 3 (three) ti case there was an emergency. The patient was also advised of the potential privacy & security concerns related to the telehealth platform.    The patient was made aware of where to find [facility] notice of privacy practices, telehealth consent form and other r

## 2021-02-09 ENCOUNTER — HISTORICAL (OUTPATIENT)
Dept: ADMINISTRATIVE | Facility: HOSPITAL | Age: 51
End: 2021-02-09

## 2021-02-09 LAB
ALBUMIN SERPL BCP-MCNC: 3.6 G/DL (ref 3.5–5)
ALBUMIN/GLOB SERPL: 1.1 {RATIO}
ALP SERPL-CCNC: 64 U/L (ref 45–115)
ALT SERPL W P-5'-P-CCNC: 49 U/L (ref 16–61)
ANION GAP SERPL CALCULATED.3IONS-SCNC: 11 MMOL/L
AST SERPL W P-5'-P-CCNC: 22 U/L (ref 15–37)
BASOPHILS # BLD AUTO: 0.07 X10E3/UL (ref 0–0.2)
BASOPHILS NFR BLD AUTO: 0.6 % (ref 0–1)
BILIRUB SERPL-MCNC: 0.4 MG/DL (ref 0–1.2)
BUN SERPL-MCNC: 15 MG/DL (ref 7–18)
BUN/CREAT SERPL: 13.3
CALCIUM SERPL-MCNC: 8.4 MG/DL (ref 8.5–10.1)
CHLORIDE SERPL-SCNC: 105 MMOL/L (ref 98–107)
CK MB SERPL-MCNC: 2.7 NG/ML (ref 1–3.6)
CK SERPL-CCNC: 226 U/L (ref 39–308)
CO2 SERPL-SCNC: 30 MMOL/L (ref 21–32)
CREAT SERPL-MCNC: 1.13 MG/DL (ref 0.7–1.3)
EOSINOPHIL # BLD AUTO: 0.17 X10E3/UL (ref 0–0.5)
EOSINOPHIL NFR BLD AUTO: 1.4 % (ref 1–4)
ERYTHROCYTE [DISTWIDTH] IN BLOOD BY AUTOMATED COUNT: 12.9 % (ref 11.5–14.5)
GLOBULIN SER-MCNC: 3.3 G/DL (ref 2–4)
GLUCOSE SERPL-MCNC: 71 MG/DL (ref 74–106)
HCT VFR BLD AUTO: 44.9 % (ref 40–54)
HGB BLD-MCNC: 14.8 G/DL (ref 13.5–18)
IMM GRANULOCYTES # BLD AUTO: 0.06 X10E3/UL (ref 0–0.04)
IMM GRANULOCYTES NFR BLD: 0.5 % (ref 0–0.4)
LYMPHOCYTES # BLD AUTO: 1.63 X10E3/UL (ref 1–4.8)
LYMPHOCYTES NFR BLD AUTO: 13.9 % (ref 27–41)
MAGNESIUM SERPL-MCNC: 2.3 MG/DL (ref 1.7–2.3)
MCH RBC QN AUTO: 29.4 PG (ref 27–31)
MCHC RBC AUTO-ENTMCNC: 33 G/DL (ref 32–36)
MCV RBC AUTO: 89.1 FL (ref 80–96)
MONOCYTES # BLD AUTO: 1.05 X10E3/UL (ref 0–0.8)
MONOCYTES NFR BLD AUTO: 8.9 % (ref 2–6)
MPC BLD CALC-MCNC: 9.6 FL (ref 9.4–12.4)
MYOGLOBIN SERPL-MCNC: 111 NG/ML (ref 16–116)
NEUTROPHILS # BLD AUTO: 8.78 X10E3/UL (ref 1.8–7.7)
NEUTROPHILS NFR BLD AUTO: 74.7 % (ref 53–65)
NRBC # BLD AUTO: 0 X10E3/UL (ref 0–0)
NRBC, AUTO (.00): 0 /100 (ref 0–0)
PLATELET # BLD AUTO: 224 X10E3/UL (ref 150–400)
POTASSIUM SERPL-SCNC: 3.8 MMOL/L (ref 3.5–5.1)
PROT SERPL-MCNC: 6.9 G/DL (ref 6.4–8.2)
RBC # BLD AUTO: 5.04 X10E6/UL (ref 4.6–6.2)
SODIUM SERPL-SCNC: 142 MMOL/L (ref 136–145)
TROPONIN I SERPL-MCNC: <0.017 NG/ML (ref 0–0.06)
WBC # BLD AUTO: 11.76 X10E3/UL (ref 4.5–11)

## 2021-02-12 LAB
APTT PPP: 27.4 SECONDS (ref 25.2–37.3)
INR BLD: 0.98 (ref 0–3.3)
PROTHROMBIN TIME: 12.5 SECONDS (ref 11.7–14.7)

## 2021-04-27 RX ORDER — ESCITALOPRAM OXALATE 10 MG/1
TABLET ORAL
Qty: 90 TABLET | Refills: 0 | OUTPATIENT
Start: 2021-04-27 | End: 2021-08-08

## 2021-04-27 NOTE — TELEPHONE ENCOUNTER
escitalopram 10 MG Oral Tab 30 tablet 2 2/4/2021 5/18/2021     LOV 2/4/21.  Follow up in one month  No future OV    Rx request too soon

## 2021-09-24 ENCOUNTER — TELEPHONE (OUTPATIENT)
Dept: FAMILY MEDICINE CLINIC | Facility: CLINIC | Age: 51
End: 2021-09-24

## 2021-09-24 NOTE — TELEPHONE ENCOUNTER
Pt's wife called and said they are in [place] and Gloria Wu was exposed to [place]. She said he is real sick and wants to know if he can do the antibody infusion.

## 2021-09-24 NOTE — TELEPHONE ENCOUNTER
Spoke to patient. States has chills, body aches, general c/o not feeling well, mild cough, mild SOB. Has asthma. Is being tested today for COVID. Advised he does not meet criteria for antibodies. Advised, rest, fluids, tylenol for chills and aches.   Channing Michaud

## 2021-12-21 ENCOUNTER — TELEPHONE (OUTPATIENT)
Dept: FAMILY MEDICINE CLINIC | Facility: CLINIC | Age: 51
End: 2021-12-21

## 2021-12-21 NOTE — TELEPHONE ENCOUNTER
Received signed medical records request/authorization form for [facility] to disclose patient health information to the Facility identified below:     Facility / Provider Name: [facility] Phone: [facility]. Fax: [phone]

## 2022-01-05 ENCOUNTER — TELEPHONE (OUTPATIENT)
Dept: FAMILY MEDICINE CLINIC | Facility: CLINIC | Age: 52
End: 2022-01-05

## 2022-01-05 NOTE — TELEPHONE ENCOUNTER
Received signed medical records request/authorization form for [facility] to disclose patient health information to the Facility identified below:     Facility / Provider Name: [facility]  Facility Address: [facility]

## 2022-05-26 ENCOUNTER — TELEPHONE (OUTPATIENT)
Dept: FAMILY MEDICINE CLINIC | Facility: CLINIC | Age: 52
End: 2022-05-26

## 2023-03-02 ENCOUNTER — PATIENT OUTREACH (OUTPATIENT)
Dept: FAMILY MEDICINE CLINIC | Facility: CLINIC | Age: 53
End: 2023-03-02

## 2023-03-06 ENCOUNTER — PATIENT OUTREACH (OUTPATIENT)
Dept: CASE MANAGEMENT | Age: 53
End: 2023-03-06

## 2023-03-06 NOTE — PROCEDURES
The office order for PCP removal request is Approved and finalized on March 6, 2023.     Thanks,  [phone] [facility]

## (undated) NOTE — LETTER
ASTHMA ACTION PLAN for Shade Postin     : 1970     Date: 2017  Provider:  Lisseth Blanchard MD  Phone for doctor or clinic: [facility], [address] [facility], [address] [place] [address]  [phone]    [facility]

## (undated) NOTE — LETTER
Coronavirus Disease 2019 (COVID-19)     [facility] is committed to the safety and well-being of our patients, members, employees, and communities.  As concerns arise about the new strain of coronavirus that causes COVID-19, [facility] 4. If you have a medical appointment, call the healthcare provider ahead of time and tell them that you have or may have COVID-19.  5. For medical emergencies, call 911 and notify the dispatch personnel that you have or may have COVID-19.   6. Cover your c · At least 10 days have passed since symptoms first appeared OR if asymptomatic patient or date of symptom onset is unclear then use 10 days post COVID test date.    · At least 20 days have passed for severe illness (requiring hospitalization) OR if you are *Some people will be required to have a repeat COVID-19 test in order to be eligible to donate. If you’re instructed by Darrick Knott that a repeat test is required, please contact the [facility] COVID-19 Nurse Triage Line at [phone].     Additional Inf